# Patient Record
(demographics unavailable — no encounter records)

---

## 2025-03-04 NOTE — HISTORY OF PRESENT ILLNESS
[de-identified] : The patient is a 8 year old RT hand dominant male  who presents today complaining of LT Ankle pain.  Date of Injury/Onset: 2/13/25 Pain:    At Rest: 3/10 With Activity:  8/10 Mechanism of injury: while playing soccer his foot got caught on his friends cleat and he twisted his ankle This is NOT a Work Related Injury being treated under Worker's Compensation. This is NOT an athletic injury occurring associated with an interscholastic or organized sports team. 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

## 2025-03-04 NOTE — IMAGING
[Left] : left ankle [de-identified] : The patient is a well appearing 8 year old male of their stated age. Patient ambulates with a normal gait. Negative straight leg raise.   Effected Foot and Ankle: LEFT   Inspection: Erythema: None Ecchymosis: None Abrasions: None Effusion: None Deformity: None Pes [place] Valgus: Negative Pes Cavus: Negative   Palpation: Crepitus: None Medial Malleolus: Nontender Lateral Malleolus: TENDER MILD Syndesmosis: Nontender Proximal Fibula: Nontender ATFL: TENDER  CFL: Nontender Deltoid: Nontender Calcaneus: Nontender [place]ar Head/Neck: Nontender Peroneal Tendons: Nontender Posterior Tibialis: Nontender Achilles Tendon: Nontender & Intact Anterior Capsule: Nontender Hindfoot: Nontender Midfoot: Nontender Forefoot: Nontender   ROM: Ankle Dorsiflexion: 30 degrees Ankle Plantar Flexion: 45 degrees Motor: Dorsiflexion: 5 out of 5 Plantar Flexion: 5 out of 5 Inversion: 5  out of 5 Eversion: 5 out of 5 EHL: 5 out of 5 FHL: 5 out of 5   Provocative Testing: Anterior Drawer: Negative Syndesmosis Squeeze Test: Negative Pierce's Test: Negative Midfoot Stability/Rotation: Negative Heel Raise Inversion: Normal Triple Hop: Normal Neurologic Exam: L4-S1 Sensation: Grossly Intact Vascular Exam/Pulses: Dorsalis Pedis: 2+ Posterior Tibialis: 2+ Capillary Refill: <2 Seconds   Other Exams: None Pertinent Contralateral Findings: None   Assessment: The patient is an 8 year old male with left ankle pain and radiographic and physical exam findings consistent with mild lateral ankle sprain The patient's condition is acute. Documents/Results Reviewed Today: X-Ray left ankle Tests/Studies Independently Interpreted Today: X-Ray left ankle reveals skeletally immature individual otherwise benign Pertinent findings include: Tender ATFL, Mild Tender Lateral Malleolus Confounding medical conditions/concerns: None   Plan: Discussed treatment options for the patient's mild lateral ankle sprain. Patient will start physical therapy, HEP, and stretching. Discussed taking OTC Children's Motrin as needed - use as directed. Modify activity as discussed. The patient will begin use of a lace-up ankle brace to ensure stability and avoid recurrent injury - fitted and dispensed in office today. The patient will be shut down from gym/sports until further notice.  Tests Ordered: None  Prescription Medications Ordered: Discussed appropriate use of OTC anti-inflammatories and analgesics- Children's Motrin Braces/DME Ordered: lace up ankle brace Activity/Work/Sports Status: shut down from gym/sports until further notice Additional Instructions: None Follow-Up: 10 days  The patient's current medication management of their orthopedic diagnosis was reviewed today: The patient declined and/or was contraindicated for the recommended prescription medication Naprosyn and will use over the counter Advil, Alleve, Voltaren Gel or Tylenol as directed.  (1) We discussed a comprehensive treatment plan that included possible pharmaceutical management involving the use of prescription strength medications versus over the counter oral medications and topical prescription vs over the counter medications.  Based on our extensive discussion, the patient declined prescription medication and will use over the counter Advil, Aleve, Voltaren Gel or Tylenol as directed. (2) There is a moderate risk of morbidity with further treatment, especially from use of prescription strength medications and possible side effects of these medications which include upset stomach with oral medications, skin reactions to topical medications and cardiac/renal issues with long term use. (3) I recommended that the patient follow-up with their medical physician to discuss any significant specific potential issues with long term medication use such as interactions with current medications or with exacerbation of underlying medical comorbidities. (4) The benefits and risks associated with use of injectable, oral or topical, prescription and over the counter anti-inflammatory medications were discussed with the patient. The patient voiced understanding of the risks including but not limited to bleeding, stroke, kidney dysfunction, heart disease, and were referred to the black box warning label for further information.  Alysia MURRAY attest that this documentation has been prepared under the direction and in the presence of Fern Tracy PA-C.  The documentation recorded by the scribe accurately reflects the service Fern MURRAY PA-C, personally performed and the decisions made by me.  [FreeTextEntry9] : X-Ray left ankle reveals evidence of skeletally immature individual otherwise benign

## 2025-03-14 NOTE — IMAGING
[Left] : left ankle [de-identified] : The patient is a well appearing 8 year old male of their stated age. Patient ambulates with a normal gait. Negative straight leg raise.   Effected Foot and Ankle: LEFT   Inspection: Erythema: None Ecchymosis: None Abrasions: None Effusion: None Deformity: None Pes [place] Valgus: Negative Pes Cavus: Negative   Palpation: Crepitus: None Medial Malleolus: Nontender Lateral Malleolus: TENDER MILD Syndesmosis: Nontender Proximal Fibula: Nontender ATFL: TENDER  CFL: Nontender Deltoid: Nontender Calcaneus: Nontender Talar Head/Neck: Nontender Peroneal Tendons: Nontender Posterior Tibialis: Nontender Achilles Tendon: Nontender & Intact Anterior Capsule: Nontender Hindfoot: Nontender Midfoot: Nontender Forefoot: Nontender   ROM: Ankle Dorsiflexion: 30 degrees Ankle Plantar Flexion: 45 degrees Motor: Dorsiflexion: 5 out of 5 Plantar Flexion: 5 out of 5 Inversion: 5  out of 5 Eversion: 5 out of 5 EHL: 5 out of 5 FHL: 5 out of 5   Provocative Testing: Anterior Drawer: Negative Syndesmosis Squeeze Test: Negative Pierce's Test: Negative Midfoot Stability/Rotation: Negative Heel Raise Inversion: Normal Triple Hop: Normal Neurologic Exam: L4-S1 Sensation: Grossly Intact Vascular Exam/Pulses: Dorsalis Pedis: 2+ Posterior Tibialis: 2+ Capillary Refill: <2 Seconds   Other Exams: None Pertinent Contralateral Findings: None   Assessment: The patient is an 8-year-old male with left ankle pain and radiographic and physical exam findings consistent with mild lateral ankle sprain The patient's condition is acute. Documents/Results Reviewed Today: None Tests/Studies Independently Interpreted Today: None Pertinent findings include: Tender ATFL, Mild Tender Lateral Malleolus Confounding medical conditions/concerns: None   Plan: Patient reports gradual improvement but still some discomfort with symptoms related to mild lateral ankle sprain. Patient will continue physical therapy, HEP, and stretching. Discussed taking OTC Children's Motrin as needed - use as directed. Modify activity as discussed. The patient will continue use of a lace-up ankle brace to ensure stability and avoid recurrent injury. The patient is cleared for gym/sports in lace up ankle brace but discussed gradually advancing activity avoiding fatigue. Recommended utilizing Voltaren gel.  Tests Ordered: None  Prescription Medications Ordered: Discussed appropriate use of OTC anti-inflammatories and analgesics- Children's Motrin Braces/DME Ordered: lace up ankle brace Activity/Work/Sports Status: cleared gym/sports in lace up ankle brace Additional Instructions: Voltaren gel  Follow-Up: 2 weeks   The patient's current medication management of their orthopedic diagnosis was reviewed today: The patient declined and/or was contraindicated for the recommended prescription medication Naprosyn and will use over the counter Advil, Alleve, Voltaren Gel or Tylenol as directed.  (1) We discussed a comprehensive treatment plan that included possible pharmaceutical management involving the use of prescription strength medications versus over the counter oral medications and topical prescription vs over the counter medications.  Based on our extensive discussion, the patient declined prescription medication and will use over the counter Advil, Aleve, Voltaren Gel or Tylenol as directed. (2) There is a moderate risk of morbidity with further treatment, especially from use of prescription strength medications and possible side effects of these medications which include upset stomach with oral medications, skin reactions to topical medications and cardiac/renal issues with long term use. (3) I recommended that the patient follow-up with their medical physician to discuss any significant specific potential issues with long term medication use such as interactions with current medications or with exacerbation of underlying medical comorbidities. (4) The benefits and risks associated with use of injectable, oral or topical, prescription and over the counter anti-inflammatory medications were discussed with the patient. The patient voiced understanding of the risks including but not limited to bleeding, stroke, kidney dysfunction, heart disease, and were referred to the black box warning label for further information.  Alysia MURRAY attest that this documentation has been prepared under the direction and in the presence of Fern Tracy PA-C.  The documentation recorded by the scribe accurately reflects the service Fern MURRAY PA-C, personally performed and the decisions made by me. [FreeTextEntry9] : X-Ray left ankle reveals evidence of skeletally immature individual otherwise benign

## 2025-03-14 NOTE — HISTORY OF PRESENT ILLNESS
[de-identified] : The patient is a 8 year old RT hand dominant male  who presents today complaining of LT Ankle pain.  Date of Injury/Onset: 2/13/25 Pain:    At Rest: 0/10 With Activity:  0/10 Mechanism of injury: while playing soccer his foot got caught on his friends cleat and he twisted his ankle This is NOT a Work Related Injury being treated under Worker's Compensation. This is NOT an athletic injury occurring associated with an interscholastic or organized sports team. Quality of symptoms: sharp lateral ankle pain, swelling Improves with: rest Worse with: running, soccer Treatment/Imaging/Studies Since Last Visit: [facility] Reports Available For Review Today: none Change since last visit:  Mom reports patient has behaved more or less the same since last visit. Patient reports dec in pain and mom agrees he has not complained of pain with ADL's or therex at PT. Has been complaint with use of ankle brace. 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

## 2025-03-14 NOTE — IMAGING
[Left] : left ankle [de-identified] : The patient is a well appearing 8 year old male of their stated age. Patient ambulates with a normal gait. Negative straight leg raise.   Effected Foot and Ankle: LEFT   Inspection: Erythema: None Ecchymosis: None Abrasions: None Effusion: None Deformity: None Pes [place] Valgus: Negative Pes Cavus: Negative   Palpation: Crepitus: None Medial Malleolus: Nontender Lateral Malleolus: TENDER MILD Syndesmosis: Nontender Proximal Fibula: Nontender ATFL: TENDER  CFL: Nontender Deltoid: Nontender Calcaneus: Nontender [place]ar Head/Neck: Nontender Peroneal Tendons: Nontender Posterior Tibialis: Nontender Achilles Tendon: Nontender & Intact Anterior Capsule: Nontender Hindfoot: Nontender Midfoot: Nontender Forefoot: Nontender   ROM: Ankle Dorsiflexion: 30 degrees Ankle Plantar Flexion: 45 degrees Motor: Dorsiflexion: 5 out of 5 Plantar Flexion: 5 out of 5 Inversion: 5  out of 5 Eversion: 5 out of 5 EHL: 5 out of 5 FHL: 5 out of 5   Provocative Testing: Anterior Drawer: Negative Syndesmosis Squeeze Test: Negative Pierce's Test: Negative Midfoot Stability/Rotation: Negative Heel Raise Inversion: Normal Triple Hop: Normal Neurologic Exam: L4-S1 Sensation: Grossly Intact Vascular Exam/Pulses: Dorsalis Pedis: 2+ Posterior Tibialis: 2+ Capillary Refill: <2 Seconds   Other Exams: None Pertinent Contralateral Findings: None   Assessment: The patient is an 8-year-old male with left ankle pain and radiographic and physical exam findings consistent with mild lateral ankle sprain The patient's condition is acute. Documents/Results Reviewed Today: None Tests/Studies Independently Interpreted Today: None Pertinent findings include: Tender ATFL, Mild Tender Lateral Malleolus Confounding medical conditions/concerns: None   Plan: Patient reports gradual improvement but still some discomfort with symptoms related to mild lateral ankle sprain. Patient will continue physical therapy, HEP, and stretching. Discussed taking OTC Children's Motrin as needed - use as directed. Modify activity as discussed. The patient will continue use of a lace-up ankle brace to ensure stability and avoid recurrent injury. The patient is cleared for gym/sports in lace up ankle brace but discussed gradually advancing activity avoiding fatigue. Recommended utilizing Voltaren gel.  Tests Ordered: None  Prescription Medications Ordered: Discussed appropriate use of OTC anti-inflammatories and analgesics- Children's Motrin Braces/DME Ordered: lace up ankle brace Activity/Work/Sports Status: cleared gym/sports in lace up ankle brace Additional Instructions: Voltaren gel  Follow-Up: 2 weeks   The patient's current medication management of their orthopedic diagnosis was reviewed today: The patient declined and/or was contraindicated for the recommended prescription medication Naprosyn and will use over the counter Advil, Alleve, Voltaren Gel or Tylenol as directed.  (1) We discussed a comprehensive treatment plan that included possible pharmaceutical management involving the use of prescription strength medications versus over the counter oral medications and topical prescription vs over the counter medications.  Based on our extensive discussion, the patient declined prescription medication and will use over the counter Advil, Aleve, Voltaren Gel or Tylenol as directed. (2) There is a moderate risk of morbidity with further treatment, especially from use of prescription strength medications and possible side effects of these medications which include upset stomach with oral medications, skin reactions to topical medications and cardiac/renal issues with long term use. (3) I recommended that the patient follow-up with their medical physician to discuss any significant specific potential issues with long term medication use such as interactions with current medications or with exacerbation of underlying medical comorbidities. (4) The benefits and risks associated with use of injectable, oral or topical, prescription and over the counter anti-inflammatory medications were discussed with the patient. The patient voiced understanding of the risks including but not limited to bleeding, stroke, kidney dysfunction, heart disease, and were referred to the black box warning label for further information.  Alysia MURRAY attest that this documentation has been prepared under the direction and in the presence of Fern Tracy PA-C.  The documentation recorded by the scribe accurately reflects the service Fern MURRAY PA-C, personally performed and the decisions made by me. [FreeTextEntry9] : X-Ray left ankle reveals evidence of skeletally immature individual otherwise benign

## 2025-03-14 NOTE — HISTORY OF PRESENT ILLNESS
[de-identified] : The patient is a 8 year old RT hand dominant male  who presents today complaining of LT Ankle pain.  Date of Injury/Onset: 2/13/25 Pain:    At Rest: 0/10 With Activity:  0/10 Mechanism of injury: while playing soccer his foot got caught on his friends cleat and he twisted his ankle This is NOT a Work Related Injury being treated under Worker's Compensation. This is NOT an athletic injury occurring associated with an interscholastic or organized sports team. Quality of symptoms: sharp lateral ankle pain, swelling Improves with: rest Worse with: running, soccer Treatment/Imaging/Studies Since Last Visit: [facility] Reports Available For Review Today: none Change since last visit:  Mom reports patient has behaved more or less the same since last visit. Patient reports dec in pain and mom agrees he has not complained of pain with ADL's or therex at PT. Has been complaint with use of ankle brace. Mom reports patient has only gone to PT twice but does see benefit from treatment  Out of work/sport: currently playing gym/sports School/Sport/Position/Occupation: student at [employer] [place] : soccer, baseball, lacrosse, football Additional Information: played soccer last weekend and his ankle swelled up after